# Patient Record
Sex: MALE | Race: WHITE | NOT HISPANIC OR LATINO | Employment: FULL TIME | URBAN - METROPOLITAN AREA
[De-identification: names, ages, dates, MRNs, and addresses within clinical notes are randomized per-mention and may not be internally consistent; named-entity substitution may affect disease eponyms.]

---

## 2023-07-24 ENCOUNTER — APPOINTMENT (OUTPATIENT)
Dept: RADIOLOGY | Facility: IMAGING CENTER | Age: 28
End: 2023-07-24
Attending: PHYSICIAN ASSISTANT
Payer: COMMERCIAL

## 2023-07-24 ENCOUNTER — OFFICE VISIT (OUTPATIENT)
Dept: URGENT CARE | Facility: CLINIC | Age: 28
End: 2023-07-24
Payer: COMMERCIAL

## 2023-07-24 VITALS
TEMPERATURE: 97.4 F | OXYGEN SATURATION: 97 % | WEIGHT: 223 LBS | HEART RATE: 64 BPM | SYSTOLIC BLOOD PRESSURE: 104 MMHG | RESPIRATION RATE: 16 BRPM | HEIGHT: 73 IN | BODY MASS INDEX: 29.55 KG/M2 | DIASTOLIC BLOOD PRESSURE: 72 MMHG

## 2023-07-24 DIAGNOSIS — S69.91XA INJURY OF RIGHT WRIST, INITIAL ENCOUNTER: ICD-10-CM

## 2023-07-24 DIAGNOSIS — S63.501A SPRAIN OF RIGHT WRIST, INITIAL ENCOUNTER: ICD-10-CM

## 2023-07-24 PROCEDURE — 99203 OFFICE O/P NEW LOW 30 MIN: CPT | Performed by: PHYSICIAN ASSISTANT

## 2023-07-24 PROCEDURE — 3078F DIAST BP <80 MM HG: CPT | Performed by: PHYSICIAN ASSISTANT

## 2023-07-24 PROCEDURE — 3074F SYST BP LT 130 MM HG: CPT | Performed by: PHYSICIAN ASSISTANT

## 2023-07-24 PROCEDURE — 73110 X-RAY EXAM OF WRIST: CPT | Mod: TC,FY,RT | Performed by: PHYSICIAN ASSISTANT

## 2023-07-24 ASSESSMENT — ENCOUNTER SYMPTOMS
FOCAL WEAKNESS: 0
SENSORY CHANGE: 0
TINGLING: 0
FEVER: 0
NAUSEA: 0
VOMITING: 0

## 2023-07-24 NOTE — PROGRESS NOTES
"Subjective     John Rivas is a 27 y.o. male who presents with Wrist Pain (Right wrist pain, swelling X 1 day)    HPI:  John Rivas is a 27 y.o. male who presents today for evaluation of right wrist pain.  Patient fell backwards off a stool yesterday.  Put his arms out to brace his fall and landed on his right wrist/hand.  Says he had discomfort right away but was moving it fine.  This morning, however, feels much more stiff and pain is targeted to the dorsal/ulnar aspect of the wrist.  Believes he broke his wrist when he was a child but never had any surgery.  He has no numbness or tingling.  He is right-hand dominant.        Review of Systems   Constitutional:  Negative for fever.   Gastrointestinal:  Negative for nausea and vomiting.   Musculoskeletal:         Right wrist   Neurological:  Negative for tingling, sensory change and focal weakness.               PMH:  has no past medical history on file.  MEDS: No current outpatient medications on file.  ALLERGIES: No Known Allergies  SURGHX: History reviewed. No pertinent surgical history.  SOCHX:    FH: Family history was reviewed, no pertinent findings to report        Objective     /72 (BP Location: Left arm, Patient Position: Sitting, BP Cuff Size: Adult)   Pulse 64   Temp 36.3 °C (97.4 °F) (Temporal)   Resp 16   Ht 1.854 m (6' 1\")   Wt 101 kg (223 lb)   SpO2 97%   BMI 29.42 kg/m²      Physical Exam  Constitutional:       General: He is not in acute distress.     Appearance: He is not diaphoretic.   HENT:      Head: Normocephalic and atraumatic.      Right Ear: External ear normal.      Left Ear: External ear normal.   Eyes:      Conjunctiva/sclera: Conjunctivae normal.      Pupils: Pupils are equal, round, and reactive to light.   Pulmonary:      Effort: Pulmonary effort is normal. No respiratory distress.   Musculoskeletal:      Right wrist: Swelling, tenderness and bony tenderness present. No snuff box tenderness. Decreased range of " motion.      Cervical back: Normal range of motion.      Comments: Patient with mild soft tissue swelling of right wrist.  There is tenderness on the dorsal/ulnar aspect.  No overlying erythema or ecchymosis.  Decreased ROM in all planes secondary to pain/swelling.  Distal neurovascular status intact.  Cap refill of all fingers less than 2 seconds.  5/5  strength.   Skin:     Findings: No rash.   Neurological:      Mental Status: He is alert and oriented to person, place, and time.   Psychiatric:         Mood and Affect: Mood and affect normal.         Cognition and Memory: Memory normal.         Judgment: Judgment normal.               DX-WRIST-COMPLETE 3+ RIGHT  COMPARISON: None     FINDINGS:  Bone mineralization is age appropriate. Bony alignment is anatomic. There is no evidence of acute fracture or dislocation.     IMPRESSION:     No radiographic evidence of acute traumatic injury.  Assessment & Plan     1. Sprain of right wrist, initial encounter  - DX-WRIST-COMPLETE 3+ RIGHT; Future  No evidence of fracture or dislocation on x-ray.  Will treat for right wrist sprain.  Patient given a right wrist brace.  Recommend that he use this fairly consistently for 3 to 4 days then he can start weaning out of it as tolerated.  RICE therapies discussed.  He may use OTC analgesics to help.  If no significant improvement in symptoms after 1 week he should follow-up for reevaluation.        Differential Diagnosis, natural history, and supportive care discussed. Return to the Urgent Care or follow up with your PCP if symptoms fail to resolve, or for any new or worsening symptoms. Emergency room precautions discussed. Patient and/or family appears understanding of information.